# Patient Record
Sex: FEMALE | Race: OTHER | NOT HISPANIC OR LATINO | URBAN - METROPOLITAN AREA
[De-identification: names, ages, dates, MRNs, and addresses within clinical notes are randomized per-mention and may not be internally consistent; named-entity substitution may affect disease eponyms.]

---

## 2019-03-25 ENCOUNTER — EMERGENCY (EMERGENCY)
Facility: HOSPITAL | Age: 24
LOS: 1 days | Discharge: HOME | End: 2019-03-25
Attending: EMERGENCY MEDICINE | Admitting: EMERGENCY MEDICINE
Payer: COMMERCIAL

## 2019-03-25 VITALS
RESPIRATION RATE: 16 BRPM | OXYGEN SATURATION: 100 % | HEART RATE: 77 BPM | SYSTOLIC BLOOD PRESSURE: 124 MMHG | WEIGHT: 199.96 LBS | DIASTOLIC BLOOD PRESSURE: 86 MMHG | TEMPERATURE: 97 F | HEIGHT: 67 IN

## 2019-03-25 VITALS
SYSTOLIC BLOOD PRESSURE: 146 MMHG | DIASTOLIC BLOOD PRESSURE: 72 MMHG | RESPIRATION RATE: 16 BRPM | OXYGEN SATURATION: 100 % | HEART RATE: 74 BPM | TEMPERATURE: 99 F

## 2019-03-25 DIAGNOSIS — Z3A.01 LESS THAN 8 WEEKS GESTATION OF PREGNANCY: ICD-10-CM

## 2019-03-25 DIAGNOSIS — M54.5 LOW BACK PAIN: ICD-10-CM

## 2019-03-25 DIAGNOSIS — O99.89 OTHER SPECIFIED DISEASES AND CONDITIONS COMPLICATING PREGNANCY, CHILDBIRTH AND THE PUERPERIUM: ICD-10-CM

## 2019-03-25 DIAGNOSIS — M54.9 DORSALGIA, UNSPECIFIED: ICD-10-CM

## 2019-03-25 LAB
ALBUMIN SERPL ELPH-MCNC: 4.4 G/DL — SIGNIFICANT CHANGE UP (ref 3.3–5)
ALP SERPL-CCNC: 60 U/L — SIGNIFICANT CHANGE UP (ref 40–120)
ALT FLD-CCNC: 25 U/L — SIGNIFICANT CHANGE UP (ref 10–45)
ANION GAP SERPL CALC-SCNC: 13 MMOL/L — SIGNIFICANT CHANGE UP (ref 5–17)
APPEARANCE UR: CLEAR — SIGNIFICANT CHANGE UP
AST SERPL-CCNC: 29 U/L — SIGNIFICANT CHANGE UP (ref 10–40)
BASOPHILS # BLD AUTO: 0.01 K/UL — SIGNIFICANT CHANGE UP (ref 0–0.2)
BASOPHILS NFR BLD AUTO: 0.2 % — SIGNIFICANT CHANGE UP (ref 0–2)
BILIRUB SERPL-MCNC: 0.2 MG/DL — SIGNIFICANT CHANGE UP (ref 0.2–1.2)
BILIRUB UR-MCNC: NEGATIVE — SIGNIFICANT CHANGE UP
BLD GP AB SCN SERPL QL: NEGATIVE — SIGNIFICANT CHANGE UP
BUN SERPL-MCNC: 19 MG/DL — SIGNIFICANT CHANGE UP (ref 7–23)
CALCIUM SERPL-MCNC: 9.1 MG/DL — SIGNIFICANT CHANGE UP (ref 8.4–10.5)
CHLORIDE SERPL-SCNC: 105 MMOL/L — SIGNIFICANT CHANGE UP (ref 96–108)
CO2 SERPL-SCNC: 19 MMOL/L — LOW (ref 22–31)
COLOR SPEC: YELLOW — SIGNIFICANT CHANGE UP
CREAT SERPL-MCNC: 0.69 MG/DL — SIGNIFICANT CHANGE UP (ref 0.5–1.3)
DIFF PNL FLD: NEGATIVE — SIGNIFICANT CHANGE UP
EOSINOPHIL # BLD AUTO: 0.03 K/UL — SIGNIFICANT CHANGE UP (ref 0–0.5)
EOSINOPHIL NFR BLD AUTO: 0.5 % — SIGNIFICANT CHANGE UP (ref 0–6)
GLUCOSE SERPL-MCNC: 95 MG/DL — SIGNIFICANT CHANGE UP (ref 70–99)
GLUCOSE UR QL: NEGATIVE — SIGNIFICANT CHANGE UP
HCG SERPL-ACNC: 167.4 MIU/ML — HIGH
HCT VFR BLD CALC: 41.9 % — SIGNIFICANT CHANGE UP (ref 34.5–45)
HGB BLD-MCNC: 14.4 G/DL — SIGNIFICANT CHANGE UP (ref 11.5–15.5)
IMM GRANULOCYTES NFR BLD AUTO: 0.3 % — SIGNIFICANT CHANGE UP (ref 0–1.5)
KETONES UR-MCNC: 15 MG/DL
LEUKOCYTE ESTERASE UR-ACNC: NEGATIVE — SIGNIFICANT CHANGE UP
LYMPHOCYTES # BLD AUTO: 2.06 K/UL — SIGNIFICANT CHANGE UP (ref 1–3.3)
LYMPHOCYTES # BLD AUTO: 31.8 % — SIGNIFICANT CHANGE UP (ref 13–44)
MCHC RBC-ENTMCNC: 32.4 PG — SIGNIFICANT CHANGE UP (ref 27–34)
MCHC RBC-ENTMCNC: 34.4 GM/DL — SIGNIFICANT CHANGE UP (ref 32–36)
MCV RBC AUTO: 94.2 FL — SIGNIFICANT CHANGE UP (ref 80–100)
MONOCYTES # BLD AUTO: 0.43 K/UL — SIGNIFICANT CHANGE UP (ref 0–0.9)
MONOCYTES NFR BLD AUTO: 6.6 % — SIGNIFICANT CHANGE UP (ref 2–14)
NEUTROPHILS # BLD AUTO: 3.93 K/UL — SIGNIFICANT CHANGE UP (ref 1.8–7.4)
NEUTROPHILS NFR BLD AUTO: 60.6 % — SIGNIFICANT CHANGE UP (ref 43–77)
NITRITE UR-MCNC: NEGATIVE — SIGNIFICANT CHANGE UP
NRBC # BLD: 0 /100 WBCS — SIGNIFICANT CHANGE UP (ref 0–0)
PH UR: 5.5 — SIGNIFICANT CHANGE UP (ref 5–8)
PLATELET # BLD AUTO: 311 K/UL — SIGNIFICANT CHANGE UP (ref 150–400)
POTASSIUM SERPL-MCNC: 4.5 MMOL/L — SIGNIFICANT CHANGE UP (ref 3.5–5.3)
POTASSIUM SERPL-SCNC: 4.5 MMOL/L — SIGNIFICANT CHANGE UP (ref 3.5–5.3)
PROT SERPL-MCNC: 7.6 G/DL — SIGNIFICANT CHANGE UP (ref 6–8.3)
PROT UR-MCNC: NEGATIVE MG/DL — SIGNIFICANT CHANGE UP
RBC # BLD: 4.45 M/UL — SIGNIFICANT CHANGE UP (ref 3.8–5.2)
RBC # FLD: 12.3 % — SIGNIFICANT CHANGE UP (ref 10.3–14.5)
RH IG SCN BLD-IMP: POSITIVE — SIGNIFICANT CHANGE UP
SODIUM SERPL-SCNC: 137 MMOL/L — SIGNIFICANT CHANGE UP (ref 135–145)
SP GR SPEC: >=1.03 — SIGNIFICANT CHANGE UP (ref 1–1.03)
UROBILINOGEN FLD QL: 0.2 E.U./DL — SIGNIFICANT CHANGE UP
WBC # BLD: 6.48 K/UL — SIGNIFICANT CHANGE UP (ref 3.8–10.5)
WBC # FLD AUTO: 6.48 K/UL — SIGNIFICANT CHANGE UP (ref 3.8–10.5)

## 2019-03-25 PROCEDURE — 87086 URINE CULTURE/COLONY COUNT: CPT

## 2019-03-25 PROCEDURE — 36415 COLL VENOUS BLD VENIPUNCTURE: CPT

## 2019-03-25 PROCEDURE — 76817 TRANSVAGINAL US OBSTETRIC: CPT

## 2019-03-25 PROCEDURE — 99284 EMERGENCY DEPT VISIT MOD MDM: CPT

## 2019-03-25 PROCEDURE — 86900 BLOOD TYPING SEROLOGIC ABO: CPT

## 2019-03-25 PROCEDURE — 86901 BLOOD TYPING SEROLOGIC RH(D): CPT

## 2019-03-25 PROCEDURE — 80053 COMPREHEN METABOLIC PANEL: CPT

## 2019-03-25 PROCEDURE — 76817 TRANSVAGINAL US OBSTETRIC: CPT | Mod: 26

## 2019-03-25 PROCEDURE — 76801 OB US < 14 WKS SINGLE FETUS: CPT

## 2019-03-25 PROCEDURE — 85025 COMPLETE CBC W/AUTO DIFF WBC: CPT

## 2019-03-25 PROCEDURE — 81003 URINALYSIS AUTO W/O SCOPE: CPT

## 2019-03-25 PROCEDURE — 86850 RBC ANTIBODY SCREEN: CPT

## 2019-03-25 PROCEDURE — 84702 CHORIONIC GONADOTROPIN TEST: CPT

## 2019-03-25 PROCEDURE — 87491 CHLMYD TRACH DNA AMP PROBE: CPT

## 2019-03-25 PROCEDURE — 87591 N.GONORRHOEAE DNA AMP PROB: CPT

## 2019-03-25 PROCEDURE — 76801 OB US < 14 WKS SINGLE FETUS: CPT | Mod: 26

## 2019-03-25 RX ORDER — SODIUM CHLORIDE 9 MG/ML
1000 INJECTION INTRAMUSCULAR; INTRAVENOUS; SUBCUTANEOUS ONCE
Qty: 0 | Refills: 0 | Status: DISCONTINUED | OUTPATIENT
Start: 2019-03-25 | End: 2019-03-29

## 2019-03-25 RX ORDER — ACETAMINOPHEN 500 MG
650 TABLET ORAL ONCE
Qty: 0 | Refills: 0 | Status: COMPLETED | OUTPATIENT
Start: 2019-03-25 | End: 2019-03-25

## 2019-03-25 RX ADMIN — Medication 650 MILLIGRAM(S): at 16:35

## 2019-03-25 NOTE — ED ADULT NURSE NOTE - OBJECTIVE STATEMENT
Pt reports left flank pain since Thursday, reports nausea and vomiting yesterday, denies nausea at this time.

## 2019-03-25 NOTE — ED ADULT TRIAGE NOTE - CCCP TRG CHIEF CMPLNT
"[facility]: [phone]                                              INTERAGENCY TRANSFER FORM - PHYSICIAN ORDERS   2018                    Hospital Admission Date: 2018  DENTON MALLORY   : 1933  Sex: Female        Attending Provider: Bryce Holcomb MD     Allergies:  Nkda [No Known Drug Allergies]    Infection:  None   Service:  INTERNAL MED    Ht:  1.607 m (5' 3.25\")   Wt:  --   Admission Wt:  --    BMI:  --   BSA:  --            Patient PCP Information     Provider PCP Type    Kameron Cool MD General      ED Clinical Impression     Diagnosis Description Comment Added By Time Added    Fall, initial encounter [W19.XXXA] Fall, initial encounter [W19.XXXA] Found on the floor in her bedroom on carpet, face down. Ad Melendez MD 2018  4:22 PM    History of dementia [Z86.59] History of dementia [Z86.59]  Ad Melendez MD 2018  4:22 PM    Falls frequently [R29.6] Falls frequently [R29.6]  Ad Melendez MD 2018  4:22 PM    Contusion, cheek, initial encounter [S00.83XA] Contusion, cheek, initial encounter [S00.83XA] post- unwitnessed fall. Ad Melendez MD 2018  4:27 PM      Hospital Problems as of 2018              Priority Class Noted POA    HTN, goal below 150/90 Medium  10/17/2013 Yes    Dementia Medium  2018 Yes    Agitation Medium  2018 Yes      Non-Hospital Problems as of 2018              Priority Class Noted    SENSONRL HEAR LOSS,BILAT Medium  2006    Osteopenia Medium  2010    Varicose vein of leg Medium  2010    Loss of height Medium  2010    Family history of ischemic heart disease Medium  2010    S/P lumpectomy of breast Medium  2010    CARDIOVASCULAR SCREENING; LDL GOAL LESS THAN 160 Medium  10/31/2010    [place] (hard of hearing) Medium  2011    Anxiety Medium  2011    Advanced directives, counseling/discussion Medium  2012    Mild " back pain general major depression (H) Medium  10/17/2013    Memory changes Medium  1/30/2015    Mild dementia Medium  11/11/2015      Code Status History     Date Active Date Inactive Code Status Order ID Comments User Context    This patient has a current code status but no historical code status.         Medication Review      START taking        Dose / Directions Comments    * QUEtiapine 50 MG tablet   Commonly known as:  SEROquel        Dose:  50 mg   Take 1 tablet (50 mg) by mouth At Bedtime   Quantity:  60 tablet   Refills:  0        * QUEtiapine 25 MG tablet   Commonly known as:  SEROquel        Dose:  25 mg   Start taking on:  1/18/2018   Take 1 tablet (25 mg) by mouth daily (with breakfast)   Quantity:  60 tablet   Refills:  0        risperiDONE 0.25 MG tablet   Commonly known as:  risperDAL   Used for:  History of dementia        Dose:  0.25 mg   Take 1 tablet (0.25 mg) by mouth At Bedtime   Refills:  0        * Notice:  This list has 2 medication(s) that are the same as other medications prescribed for you. Read the directions carefully, and ask your doctor or other care provider to review them with you.      CONTINUE these medications which may have CHANGED, or have new prescriptions. If we are uncertain of the size of tablets/capsules you have at home, strength may be listed as something that might have changed.        Dose / Directions Comments    LORazepam 0.5 MG tablet   Commonly known as:  ATIVAN   This may have changed:  when to take this   Used for:  History of dementia        Dose:  0.5 mg   Take 1 tablet (0.5 mg) by mouth every 6 hours as needed for anxiety   Quantity:  15 tablet   Refills:  0          CONTINUE these medications which have NOT CHANGED        Dose / Directions Comments    ASPIRIN CAPS 81 MG OR        1 TABLET DAILY   Refills:  0        CRANBERRY PO        Dose:  300 mg   Take 300 mg by mouth 3 times daily   Refills:  0        donepezil 10 MG tablet   Commonly known as:  ARICEPT   Used for:  Mild  dementia        Dose:  10 mg   Take 1 tablet (10 mg) by mouth At Bedtime   Quantity:  30 tablet   Refills:  0        ENSURE Liqd        Take by mouth as needed   Refills:  0        hydrochlorothiazide 25 MG tablet   Commonly known as:  HYDRODIURIL   Used for:  Edema, unspecified type        Dose:  25 mg   Take 1 tablet (25 mg) by mouth daily (Needs follow-up appointment for this medication)   Quantity:  30 tablet   Refills:  0        loratadine 10 MG tablet   Commonly known as:  CLARITIN        Dose:  10 mg   Take 10 mg by mouth daily   Refills:  0        losartan 25 MG tablet   Commonly known as:  COZAAR   Used for:  HTN, goal below 150/90        Dose:  25 mg   Take 1 tablet (25 mg) by mouth daily (Needs follow-up appointment for this medication)   Quantity:  30 tablet   Refills:  0        MELATONIN PO        Dose:  10 mg   Take 10 mg by mouth At Bedtime   Refills:  0        sertraline 50 MG tablet   Commonly known as:  ZOLOFT   Used for:  Anxiety        Dose:  50 mg   Take 1 tablet (50 mg) by mouth daily (Needs follow-up appointment for this medication)   Quantity:  30 tablet   Refills:  0          STOP taking     doxylamine 25 MG Tabs tablet   Commonly known as:  UNISOM                     Further instructions from your care team       Follow up with primary care within ten days.    After Care     Activity - Up with nursing assistance           Advance Diet as Tolerated       Follow this diet upon discharge:       Regular Diet Adult       General info for SNF       Length of Stay Estimate: Long Term Care  Condition at Discharge: Stable  Level of care:skilled   Rehabilitation Potential: Poor  Admission H&P remains valid and up-to-date: Yes  Recent Chemotherapy: N/A  Use Nursing Home Standing Orders: Yes       Mantoux instructions       Give two-step Mantoux (PPD) Per Facility Policy Yes             Statement of Approval     Ordered          01/17/18 5012  I have reviewed and agree with all the recommendations and  orders detailed in this document.  EFFECTIVE NOW     Approved and electronically signed by:  Bryce Holcomb MD           01/15/18 1027  I have reviewed and agree with all the recommendations and orders detailed in this document.  EFFECTIVE NOW     Approved and electronically signed by:  Bryce Holcomb MD

## 2019-03-25 NOTE — ED PROVIDER NOTE - OBJECTIVE STATEMENT
22 y/o f with no pmh present to ED BIBA c/o left flank radiating to left leg atraumatic. Pain worsens with movement better at rest. Denies fever, n, v, sob, chest pain, dysuria, frequency or urgency. No vaginal bleeding. No paresthesia ot paresis.

## 2019-03-25 NOTE — ED ADULT TRIAGE NOTE - CHIEF COMPLAINT QUOTE
Pt BIBA CO Lower Back pain radiating to Left flank and down left leg.  Pt denies falls, trauma, dizziness, N/V/D, SOB, Fevers, CP.  Pt states "I use to have gall stones and they took out my gallbladder so I think this must be kidney stones."

## 2019-03-25 NOTE — ED ADULT NURSE NOTE - CHPI ED NUR SYMPTOMS NEG
no hematuria/no dysuria/no diarrhea/no chills/no abdominal distension/no blood in stool/no burning urination/no fever

## 2019-03-25 NOTE — ED PROVIDER NOTE - CLINICAL SUMMARY MEDICAL DECISION MAKING FREE TEXT BOX
Patient with left lower back pain, afebrile and atraumatic. Normal labs , + beta hcg 167.4, RH +. sono shows no extrauterine masses. Recommend f/u with OB. Pain better controlled. She was ambulating well.

## 2019-03-25 NOTE — ED ADULT NURSE NOTE - NSIMPLEMENTINTERV_GEN_ALL_ED
Implemented All Universal Safety Interventions:  [place] to call system. Call bell, personal items and telephone within reach. Instruct patient to call for assistance. Room bathroom lighting operational. Non-slip footwear when patient is off stretcher. Physically safe environment: no spills, clutter or unnecessary equipment. Stretcher in lowest position, wheels locked, appropriate side rails in place.

## 2019-03-25 NOTE — ED PROVIDER NOTE - NSFOLLOWUPCLINICS_GEN_ALL_ED_FT
[facility] [address] [place] [address]  Family Medicine  [address] [place] [address]  Phone: [phone]  Fax: [phone]  Follow Up Time:

## 2019-03-26 LAB
CULTURE RESULTS: SIGNIFICANT CHANGE UP
SPECIMEN SOURCE: SIGNIFICANT CHANGE UP

## 2019-03-27 LAB
C TRACH RRNA SPEC QL NAA+PROBE: SIGNIFICANT CHANGE UP
N GONORRHOEA RRNA SPEC QL NAA+PROBE: SIGNIFICANT CHANGE UP
SPECIMEN SOURCE: SIGNIFICANT CHANGE UP

## 2024-02-16 NOTE — ED ADULT NURSE NOTE - CAS DISCH CONDITION
Patient had been on Bipap, Staff at [facility] responded to a low O2 alarm and found patient had pulled off mask, was blue and registering 70% on pulse ox. She arrives on NRB 15L at 98%   Stable